# Patient Record
Sex: FEMALE | Race: BLACK OR AFRICAN AMERICAN | Employment: FULL TIME | URBAN - METROPOLITAN AREA
[De-identification: names, ages, dates, MRNs, and addresses within clinical notes are randomized per-mention and may not be internally consistent; named-entity substitution may affect disease eponyms.]

---

## 2020-11-06 LAB
ABO, EXTERNAL RESULT: NORMAL
HEP B, EXTERNAL RESULT: NORMAL
HEPATITIS C ANTIBODY, EXTERNAL RESULT: NORMAL
HIV, EXTERNAL RESULT: NORMAL
RH FACTOR, EXTERNAL RESULT: POSITIVE
RPR, EXTERNAL RESULT: NORMAL
RUBELLA TITER, EXTERNAL RESULT: NORMAL

## 2020-12-26 ENCOUNTER — HOSPITAL ENCOUNTER (OUTPATIENT)
Dept: OTHER | Age: 25
Discharge: HOME | End: 2020-12-26
Payer: COMMERCIAL

## 2020-12-26 LAB
GLUCOSE FASTING: 89 MG/DL
GLUCOSE TOLERANCE TEST 1 HOUR: 160 MG/DL
GLUCOSE TOLERANCE TEST 2 HOUR: 162 MG/DL
GLUCOSE TOLERANCE TEST 3 HOUR: 117 MG/DL

## 2020-12-26 PROCEDURE — 82951 GLUCOSE TOLERANCE TEST (GTT): CPT

## 2020-12-26 PROCEDURE — 36415 COLL VENOUS BLD VENIPUNCTURE: CPT

## 2020-12-26 PROCEDURE — 82952 GTT-ADDED SAMPLES: CPT

## 2021-02-01 LAB — GBS, EXTERNAL RESULT: NEGATIVE

## 2021-02-08 ENCOUNTER — HOSPITAL ENCOUNTER (INPATIENT)
Age: 26
LOS: 2 days | Discharge: HOME | End: 2021-02-10
Attending: OBSTETRICS & GYNECOLOGY | Admitting: OBSTETRICS & GYNECOLOGY
Payer: COMMERCIAL

## 2021-02-08 ENCOUNTER — ANESTHESIA EVENT (OUTPATIENT)
Dept: LABOR AND DELIVERY | Age: 26
End: 2021-02-08
Payer: COMMERCIAL

## 2021-02-08 ENCOUNTER — ANESTHESIA (OUTPATIENT)
Dept: LABOR AND DELIVERY | Age: 26
End: 2021-02-08
Payer: COMMERCIAL

## 2021-02-08 DIAGNOSIS — Z37.9 NORMAL LABOR: Primary | ICD-10-CM

## 2021-02-08 LAB
A/G RATIO: 0.8 (ref 1.1–2.2)
ABO/RH: NORMAL
ALBUMIN SERPL-MCNC: 3.5 G/DL (ref 3.4–5)
ALP BLD-CCNC: 98 U/L (ref 40–129)
ALT SERPL-CCNC: 8 U/L (ref 10–40)
AMPHETAMINE SCREEN, URINE: NORMAL
ANION GAP SERPL CALCULATED.3IONS-SCNC: 11 MMOL/L (ref 3–16)
ANTIBODY SCREEN: NORMAL
AST SERPL-CCNC: 13 U/L (ref 15–37)
BACTERIA: ABNORMAL /HPF
BARBITURATE SCREEN URINE: NORMAL
BENZODIAZEPINE SCREEN, URINE: NORMAL
BILIRUB SERPL-MCNC: <0.2 MG/DL (ref 0–1)
BILIRUBIN URINE: NEGATIVE
BLOOD, URINE: NEGATIVE
BUN BLDV-MCNC: 8 MG/DL (ref 7–20)
BUPRENORPHINE URINE: NORMAL
CALCIUM SERPL-MCNC: 9.5 MG/DL (ref 8.3–10.6)
CANNABINOID SCREEN URINE: NORMAL
CHLORIDE BLD-SCNC: 103 MMOL/L (ref 99–110)
CLARITY: ABNORMAL
CO2: 21 MMOL/L (ref 21–32)
COCAINE METABOLITE SCREEN URINE: NORMAL
COLOR: ABNORMAL
CREAT SERPL-MCNC: 0.5 MG/DL (ref 0.6–1.1)
EPITHELIAL CELLS, UA: ABNORMAL /HPF (ref 0–5)
GFR AFRICAN AMERICAN: >60
GFR NON-AFRICAN AMERICAN: >60
GLOBULIN: 4.3 G/DL
GLUCOSE BLD-MCNC: 122 MG/DL (ref 70–99)
GLUCOSE BLD-MCNC: 86 MG/DL (ref 70–99)
GLUCOSE URINE: NEGATIVE MG/DL
HCT VFR BLD CALC: 40.1 % (ref 36–48)
HEMOGLOBIN: 12.8 G/DL (ref 12–16)
KETONES, URINE: NEGATIVE MG/DL
LEUKOCYTE ESTERASE, URINE: NEGATIVE
Lab: NORMAL
MCH RBC QN AUTO: 27.3 PG (ref 26–34)
MCHC RBC AUTO-ENTMCNC: 31.8 G/DL (ref 31–36)
MCV RBC AUTO: 85.8 FL (ref 80–100)
METHADONE SCREEN, URINE: NORMAL
MICROSCOPIC EXAMINATION: YES
NITRITE, URINE: NEGATIVE
OPIATE SCREEN URINE: NORMAL
OXYCODONE URINE: NORMAL
PDW BLD-RTO: 18.9 % (ref 12.4–15.4)
PERFORMED ON: ABNORMAL
PH UA: 5
PH UA: 5.5 (ref 5–8)
PHENCYCLIDINE SCREEN URINE: NORMAL
PLATELET # BLD: 335 K/UL (ref 135–450)
PMV BLD AUTO: 8.3 FL (ref 5–10.5)
POTASSIUM SERPL-SCNC: 3.8 MMOL/L (ref 3.5–5.1)
PROPOXYPHENE SCREEN: NORMAL
PROTEIN UA: 30 MG/DL
RBC # BLD: 4.68 M/UL (ref 4–5.2)
RBC UA: ABNORMAL /HPF (ref 0–4)
SARS-COV-2, NAAT: NOT DETECTED
SODIUM BLD-SCNC: 135 MMOL/L (ref 136–145)
SPECIFIC GRAVITY UA: >=1.03 (ref 1–1.03)
TOTAL PROTEIN: 7.8 G/DL (ref 6.4–8.2)
URIC ACID, SERUM: 4.4 MG/DL (ref 2.6–6)
URINE TYPE: ABNORMAL
UROBILINOGEN, URINE: 0.2 E.U./DL
WBC # BLD: 9.6 K/UL (ref 4–11)
WBC UA: ABNORMAL /HPF (ref 0–5)

## 2021-02-08 PROCEDURE — 84550 ASSAY OF BLOOD/URIC ACID: CPT

## 2021-02-08 PROCEDURE — 86850 RBC ANTIBODY SCREEN: CPT

## 2021-02-08 PROCEDURE — 85027 COMPLETE CBC AUTOMATED: CPT

## 2021-02-08 PROCEDURE — 81001 URINALYSIS AUTO W/SCOPE: CPT

## 2021-02-08 PROCEDURE — U0002 COVID-19 LAB TEST NON-CDC: HCPCS

## 2021-02-08 PROCEDURE — 10907ZC DRAINAGE OF AMNIOTIC FLUID, THERAPEUTIC FROM PRODUCTS OF CONCEPTION, VIA NATURAL OR ARTIFICIAL OPENING: ICD-10-PCS | Performed by: OBSTETRICS & GYNECOLOGY

## 2021-02-08 PROCEDURE — 6360000002 HC RX W HCPCS: Performed by: OBSTETRICS & GYNECOLOGY

## 2021-02-08 PROCEDURE — 80307 DRUG TEST PRSMV CHEM ANLYZR: CPT

## 2021-02-08 PROCEDURE — 2580000003 HC RX 258: Performed by: OBSTETRICS & GYNECOLOGY

## 2021-02-08 PROCEDURE — 2500000003 HC RX 250 WO HCPCS: Performed by: NURSE ANESTHETIST, CERTIFIED REGISTERED

## 2021-02-08 PROCEDURE — 7200000001 HC VAGINAL DELIVERY

## 2021-02-08 PROCEDURE — 0KQM0ZZ REPAIR PERINEUM MUSCLE, OPEN APPROACH: ICD-10-PCS | Performed by: OBSTETRICS & GYNECOLOGY

## 2021-02-08 PROCEDURE — 80053 COMPREHEN METABOLIC PANEL: CPT

## 2021-02-08 PROCEDURE — 6360000002 HC RX W HCPCS: Performed by: ANESTHESIOLOGY

## 2021-02-08 PROCEDURE — 6370000000 HC RX 637 (ALT 250 FOR IP): Performed by: OBSTETRICS & GYNECOLOGY

## 2021-02-08 PROCEDURE — 3E033VJ INTRODUCTION OF OTHER HORMONE INTO PERIPHERAL VEIN, PERCUTANEOUS APPROACH: ICD-10-PCS | Performed by: OBSTETRICS & GYNECOLOGY

## 2021-02-08 PROCEDURE — 51701 INSERT BLADDER CATHETER: CPT

## 2021-02-08 PROCEDURE — 1220000000 HC SEMI PRIVATE OB R&B

## 2021-02-08 PROCEDURE — 6360000002 HC RX W HCPCS

## 2021-02-08 PROCEDURE — 2500000003 HC RX 250 WO HCPCS

## 2021-02-08 PROCEDURE — 86780 TREPONEMA PALLIDUM: CPT

## 2021-02-08 PROCEDURE — 86901 BLOOD TYPING SEROLOGIC RH(D): CPT

## 2021-02-08 PROCEDURE — 88307 TISSUE EXAM BY PATHOLOGIST: CPT

## 2021-02-08 PROCEDURE — 3700000025 EPIDURAL BLOCK: Performed by: ANESTHESIOLOGY

## 2021-02-08 PROCEDURE — 86900 BLOOD TYPING SEROLOGIC ABO: CPT

## 2021-02-08 RX ORDER — EPHEDRINE SULFATE 50 MG/ML
INJECTION INTRAVENOUS PRN
Status: DISCONTINUED | OUTPATIENT
Start: 2021-02-08 | End: 2021-02-08 | Stop reason: SDUPTHER

## 2021-02-08 RX ORDER — METOCLOPRAMIDE HYDROCHLORIDE 5 MG/ML
INJECTION INTRAMUSCULAR; INTRAVENOUS
Status: COMPLETED
Start: 2021-02-08 | End: 2021-02-08

## 2021-02-08 RX ORDER — LIDOCAINE HYDROCHLORIDE 10 MG/ML
30 INJECTION, SOLUTION EPIDURAL; INFILTRATION; INTRACAUDAL; PERINEURAL PRN
Status: DISCONTINUED | OUTPATIENT
Start: 2021-02-08 | End: 2021-02-08

## 2021-02-08 RX ORDER — DOCUSATE SODIUM 100 MG/1
100 CAPSULE, LIQUID FILLED ORAL 2 TIMES DAILY
Status: DISCONTINUED | OUTPATIENT
Start: 2021-02-08 | End: 2021-02-10 | Stop reason: HOSPADM

## 2021-02-08 RX ORDER — MODIFIED LANOLIN
OINTMENT (GRAM) TOPICAL PRN
Status: DISCONTINUED | OUTPATIENT
Start: 2021-02-08 | End: 2021-02-10 | Stop reason: HOSPADM

## 2021-02-08 RX ORDER — TRISODIUM CITRATE DIHYDRATE AND CITRIC ACID MONOHYDRATE 500; 334 MG/5ML; MG/5ML
SOLUTION ORAL
Status: DISCONTINUED
Start: 2021-02-08 | End: 2021-02-08 | Stop reason: WASHOUT

## 2021-02-08 RX ORDER — ACETAMINOPHEN 325 MG/1
650 TABLET ORAL EVERY 4 HOURS PRN
Status: DISCONTINUED | OUTPATIENT
Start: 2021-02-08 | End: 2021-02-10 | Stop reason: HOSPADM

## 2021-02-08 RX ORDER — FENTANYL/BUPIVACAINE/NS/PF 2-1250MCG
PLASTIC BAG, INJECTION (ML) INJECTION
Status: COMPLETED
Start: 2021-02-08 | End: 2021-02-08

## 2021-02-08 RX ORDER — METOCLOPRAMIDE HYDROCHLORIDE 5 MG/ML
10 INJECTION INTRAMUSCULAR; INTRAVENOUS EVERY 6 HOURS
Status: DISCONTINUED | OUTPATIENT
Start: 2021-02-08 | End: 2021-02-08

## 2021-02-08 RX ORDER — SODIUM CHLORIDE 0.9 % (FLUSH) 0.9 %
10 SYRINGE (ML) INJECTION PRN
Status: DISCONTINUED | OUTPATIENT
Start: 2021-02-08 | End: 2021-02-10 | Stop reason: HOSPADM

## 2021-02-08 RX ORDER — FERROUS SULFATE 325(65) MG
325 TABLET ORAL
COMMUNITY

## 2021-02-08 RX ORDER — SODIUM CHLORIDE 9 MG/ML
INJECTION, SOLUTION INTRAVENOUS CONTINUOUS
Status: DISCONTINUED | OUTPATIENT
Start: 2021-02-08 | End: 2021-02-08

## 2021-02-08 RX ORDER — SODIUM CHLORIDE 0.9 % (FLUSH) 0.9 %
10 SYRINGE (ML) INJECTION PRN
Status: DISCONTINUED | OUTPATIENT
Start: 2021-02-08 | End: 2021-02-08

## 2021-02-08 RX ORDER — HYDROCODONE BITARTRATE AND ACETAMINOPHEN 5; 325 MG/1; MG/1
1 TABLET ORAL EVERY 6 HOURS PRN
Qty: 20 TABLET | Refills: 0 | Status: SHIPPED | OUTPATIENT
Start: 2021-02-08 | End: 2021-02-15

## 2021-02-08 RX ORDER — IBUPROFEN 600 MG/1
600 TABLET ORAL EVERY 6 HOURS PRN
Qty: 30 TABLET | Refills: 1 | Status: SHIPPED | OUTPATIENT
Start: 2021-02-08

## 2021-02-08 RX ORDER — HYDROCODONE BITARTRATE AND ACETAMINOPHEN 5; 325 MG/1; MG/1
2 TABLET ORAL EVERY 4 HOURS PRN
Status: DISCONTINUED | OUTPATIENT
Start: 2021-02-08 | End: 2021-02-10 | Stop reason: HOSPADM

## 2021-02-08 RX ORDER — ONDANSETRON 2 MG/ML
4 INJECTION INTRAMUSCULAR; INTRAVENOUS EVERY 6 HOURS PRN
Status: DISCONTINUED | OUTPATIENT
Start: 2021-02-08 | End: 2021-02-08

## 2021-02-08 RX ORDER — TERBUTALINE SULFATE 1 MG/ML
0.25 INJECTION, SOLUTION SUBCUTANEOUS
Status: DISCONTINUED | OUTPATIENT
Start: 2021-02-08 | End: 2021-02-08

## 2021-02-08 RX ORDER — SODIUM CHLORIDE, SODIUM LACTATE, POTASSIUM CHLORIDE, CALCIUM CHLORIDE 600; 310; 30; 20 MG/100ML; MG/100ML; MG/100ML; MG/100ML
INJECTION, SOLUTION INTRAVENOUS CONTINUOUS
Status: DISCONTINUED | OUTPATIENT
Start: 2021-02-08 | End: 2021-02-08

## 2021-02-08 RX ORDER — LIDOCAINE HYDROCHLORIDE AND EPINEPHRINE 15; 5 MG/ML; UG/ML
INJECTION, SOLUTION EPIDURAL PRN
Status: DISCONTINUED | OUTPATIENT
Start: 2021-02-08 | End: 2021-02-08 | Stop reason: SDUPTHER

## 2021-02-08 RX ORDER — SODIUM CHLORIDE 0.9 % (FLUSH) 0.9 %
10 SYRINGE (ML) INJECTION EVERY 12 HOURS SCHEDULED
Status: DISCONTINUED | OUTPATIENT
Start: 2021-02-08 | End: 2021-02-10 | Stop reason: HOSPADM

## 2021-02-08 RX ORDER — IBUPROFEN 800 MG/1
800 TABLET ORAL EVERY 8 HOURS
Status: DISCONTINUED | OUTPATIENT
Start: 2021-02-08 | End: 2021-02-10 | Stop reason: HOSPADM

## 2021-02-08 RX ORDER — HYDROCODONE BITARTRATE AND ACETAMINOPHEN 5; 325 MG/1; MG/1
1 TABLET ORAL EVERY 4 HOURS PRN
Status: DISCONTINUED | OUTPATIENT
Start: 2021-02-08 | End: 2021-02-10 | Stop reason: HOSPADM

## 2021-02-08 RX ORDER — FENTANYL/BUPIVACAINE/NS/PF 2-1250MCG
12 PLASTIC BAG, INJECTION (ML) INJECTION CONTINUOUS
Status: DISCONTINUED | OUTPATIENT
Start: 2021-02-08 | End: 2021-02-08

## 2021-02-08 RX ADMIN — CEFAZOLIN SODIUM 2000 MG: 10 INJECTION, POWDER, FOR SOLUTION INTRAVENOUS at 18:30

## 2021-02-08 RX ADMIN — IBUPROFEN 800 MG: 800 TABLET, FILM COATED ORAL at 21:56

## 2021-02-08 RX ADMIN — SODIUM CHLORIDE, POTASSIUM CHLORIDE, SODIUM LACTATE AND CALCIUM CHLORIDE: 600; 310; 30; 20 INJECTION, SOLUTION INTRAVENOUS at 13:45

## 2021-02-08 RX ADMIN — EPHEDRINE SULFATE 25 MG: 50 INJECTION, SOLUTION INTRAVENOUS at 18:29

## 2021-02-08 RX ADMIN — Medication 1 MILLI-UNITS/MIN: at 15:15

## 2021-02-08 RX ADMIN — FAMOTIDINE 20 MG: 10 INJECTION, SOLUTION INTRAVENOUS at 18:36

## 2021-02-08 RX ADMIN — Medication 166.7 ML: at 20:58

## 2021-02-08 RX ADMIN — METOCLOPRAMIDE 10 MG: 5 INJECTION, SOLUTION INTRAMUSCULAR; INTRAVENOUS at 18:36

## 2021-02-08 RX ADMIN — SODIUM CHLORIDE: 9 INJECTION, SOLUTION INTRAVENOUS at 17:30

## 2021-02-08 RX ADMIN — Medication 12 ML/HR: at 17:41

## 2021-02-08 RX ADMIN — LIDOCAINE HYDROCHLORIDE AND EPINEPHRINE 5 ML: 15; 5 INJECTION, SOLUTION EPIDURAL at 17:41

## 2021-02-08 RX ADMIN — BENZOCAINE AND LEVOMENTHOL: 200; 5 SPRAY TOPICAL at 21:56

## 2021-02-08 RX ADMIN — SODIUM CHLORIDE: 9 INJECTION, SOLUTION INTRAVENOUS at 18:36

## 2021-02-08 ASSESSMENT — PAIN DESCRIPTION - DESCRIPTORS: DESCRIPTORS: CRAMPING

## 2021-02-08 NOTE — ANESTHESIA PRE PROCEDURE
Department of Anesthesiology  Preprocedure Note       Name:  Meli Notice   Age:  31 y.o.  :  1995                                          MRN:  [id number]         Date:  2021      Surgeon: * No surgeons listed *    Procedure: Labor Pain Control vs   Medications prior to admission:   Prior to Admission medications    Medication Sig Start Date End Date Taking? Authorizing Provider   Prenatal MV-Min-Fe Fum-FA-DHA (PRENATAL 1 PO) Take 1 tablet by mouth daily   Yes Historical Provider, MD   ferrous sulfate (IRON 325) 325 (65 Fe) MG tablet Take 325 mg by mouth daily (with breakfast)   Yes Historical Provider, MD       Current medications:    Current Facility-Administered Medications   Medication Dose Route Frequency Provider Last Rate Last Admin    lactated ringers infusion   Intravenous Continuous Shelly Novak  mL/hr at 21 1345 New Bag at 21 1345    sodium chloride flush 0.9 % injection 10 mL  10 mL Intravenous PRN Shelly Novak MD        oxytocin (PITOCIN) 10 unit bolus from the bag  10 Units Intravenous PRN Shelly Novak MD        And    oxytocin (PITOCIN) 30 units in 500 mL infusion  87.3 noah-units/min Intravenous PRN Shelly Novak MD        ondansetron [facility]) injection 4 mg  4 mg Intravenous Q6H PRN Shelly Novak MD        lidocaine PF 1 % injection 30 mL  30 mL Other PRN Shelly Novak MD        oxytocin (PITOCIN) 30 units in 500 mL infusion  1-20 noah-units/min Intravenous Continuous Shelly Nvoak MD 4 mL/hr at 21 1615 4 noah-units/min at 21 1615    terbutaline (BRETHINE) injection 0.25 mg  0.25 mg Subcutaneous Once PRN Shelly Novak MD        0.9 % sodium chloride infusion   Intravenous Continuous Shelly Novak MD           Allergies:  No Known Allergies    Problem List:  There is no problem list on file for this patient. Past Medical History:  History reviewed. No pertinent past medical history. Past Surgical History:  History reviewed. No pertinent surgical history. Social History:    Social History     Tobacco Use    Smoking status: Never Smoker    Smokeless tobacco: Never Used   Substance Use Topics    Alcohol use: Not Currently     Frequency: Never                                Counseling given: Not Answered      Vital Signs (Current):   Vitals:    02/08/21 1359 02/08/21 1402 02/08/21 1520 02/08/21 1545   BP: (!) 126/93 (!) 129/98 129/84    Pulse: 89 86 81    Resp:  18  18   Temp:    37.1 °C (98.7 °F)   TempSrc:    Oral   Weight:       Height:                                                  BP Readings from Last 3 Encounters:   02/08/21 129/84       NPO Status: Time of last liquid consumption: 0900                        Time of last solid consumption: 0900                        Date of last liquid consumption: 02/08/21                        Date of last solid food consumption: 02/08/21    BMI:   Wt Readings from Last 3 Encounters:   02/08/21 158 lb (71.7 kg)     Body mass index is 30.86 kg/m². CBC:   Lab Results   Component Value Date    WBC 9.6 02/08/2021    RBC 4.68 02/08/2021    HGB 12.8 02/08/2021    HCT 40.1 02/08/2021    MCV 85.8 02/08/2021    RDW 18.9 02/08/2021     02/08/2021       CMP:   Lab Results   Component Value Date     02/08/2021    K 3.8 02/08/2021     02/08/2021    CO2 21 02/08/2021    BUN 8 02/08/2021    CREATININE 0.5 02/08/2021    GFRAA >60 02/08/2021    AGRATIO 0.8 02/08/2021    LABGLOM >60 02/08/2021    GLUCOSE 86 02/08/2021    PROT 7.8 02/08/2021    CALCIUM 9.5 02/08/2021    BILITOT <0.2 02/08/2021    ALKPHOS 98 02/08/2021    AST 13 02/08/2021    ALT 8 02/08/2021       POC Tests: No results for input(s): POCGLU, POCNA, POCK, POCCL, POCBUN, POCHEMO, POCHCT in the last 72 hours.     Coags: No results found for: PROTIME, INR, APTT    HCG (If Applicable): No results found for: PREGTESTUR, PREGSERUM, HCG, HCGQUANT Patient request pain control for labor and delivery. Discussed procedure (epidural,spinal, general and non regional options) and  options. Alternatives, benefits, risks, including but not limited to changes in VSS, allergic reaction, infection, intravascular injection, paresthesia, n/v, severe headache, temporary or permanent rare neurologic sequelae, and failed block. All questions answered and states understanding. Patient agrees to proceed. Choice of anesthetic will be determined by clinical condition at the time of anesthesia initiation. Patient request pain control for labor and delivery. Discussed procedure (epidural,spinal, general and non regional options) and  options. Alternatives, benefits, risks, including but not limited to changes in VSS, allergic reaction, infection, intravascular injection, paresthesia, n/v, severe headache, temporary or permanent rare neurologic sequelae, and failed block. All questions answered and states understanding. Patient agrees to proceed. Choice of anesthetic will be determined by clinical condition at the time of anesthesia initiation.     Antonina Lombard, JUAN - DMITRY   2021

## 2021-02-08 NOTE — H&P
Department of Obstetrics and Gynecology   Obstetrics History and Physical        CHIEF COMPLAINT:  Sent by Dr. Eleuterio Monteiro  for induction due to IUGR, nonreassuring NST in office    HISTORY OF PRESENT ILLNESS:      The patient is a 31 y.o. female at 36w4d. OB History        1    Para        Term                AB        Living           SAB        TAB        Ectopic        Molar        Multiple        Live Births                Patient presents with a chief complaint as above and is being admitted for intrauterine growth restriction    Estimated Due Date: Estimated Date of Delivery: 21    PRENATAL CARE:    Complicated by: IUGR(AC less than 1%, EFW 3.8%), dating by 26wk US    PAST OB HISTORY:  OB History        1    Para        Term                AB        Living           SAB        TAB        Ectopic        Molar        Multiple        Live Births                    Past Medical History:    History reviewed. No pertinent past medical history. Past Surgical History:    History reviewed. No pertinent surgical history. Allergies:  Patient has no known allergies.     Social History:    Social History     Socioeconomic History    Marital status:      Spouse name: Not on file    Number of children: Not on file    Years of education: Not on file    Highest education level: Not on file   Occupational History    Not on file   Social Needs    Financial resource strain: Not on file    Food insecurity     Worry: Not on file     Inability: Not on file   [employer] needs     Medical: Not on file     Non-medical: Not on file   Tobacco Use    Smoking status: Never Smoker    Smokeless tobacco: Never Used   Substance and Sexual Activity    Alcohol use: Not Currently     Frequency: Never    Drug use: Never    Sexual activity: Not Currently   Lifestyle    Physical activity     Days per week: Not on file     Minutes per session: Not on file    Stress: Not on file Relationships    Social connections     Talks on phone: Not on file     Gets together: Not on file     Attends Latter-day service: Not on file     Active member of club or organization: Not on file     Attends meetings of clubs or organizations: Not on file     Relationship status: Not on file    Intimate partner violence     Fear of current or ex partner: Not on file     Emotionally abused: Not on file     Physically abused: Not on file     Forced sexual activity: Not on file   Other Topics Concern    Not on file   Social History Narrative    Not on file     Family History:   History reviewed. No pertinent family history. Medications Prior to Admission:  Medications Prior to Admission: Prenatal MV-Min-Fe Fum-FA-DHA (PRENATAL 1 PO), Take 1 tablet by mouth daily  ferrous sulfate (IRON 325) 325 (65 Fe) MG tablet, Take 325 mg by mouth daily (with breakfast)    REVIEW OF SYSTEMS:        PHYSICAL EXAM:  Vitals:    02/08/21 1325   Weight: 158 lb (71.7 kg)   Height: 5' (1.524 m)     General appearance:  awake, alert, cooperative, no apparent distress, and appears stated age  Neurologic:  Awake, alert, oriented to name, place and time. Lungs:  No increased work of breathing, good air exchange  Abdomen:  Soft, non tender, gravid, consistent with her gestational age   Fetal heart rate:  Reassuring. Pelvis:  Adequate pelvis  Cervix: 3cm  Contraction frequency:      Membranes:  Intact    Labs: CBC: No results found for: WBC, RBC, HGB, HCT, MCV, MCH, MCHC, RDW, PLT, MPV    ASSESSMENT AND PLAN:    Labor: Admit, anticipate normal delivery, routine labor orders  Mild elevated BP, check PIH labs  Fetus: Reassuring  GBS: No  Other: plan pitocin for labor induction for IUGR, nonreassuring NST in office.   Dating by Roland Hopantonio

## 2021-02-08 NOTE — FLOWSHEET NOTE
Dr Saen Whitney notified of pt decels. decels are deep wide low and late. Pt complete and attempted pushing with MD at bedside.

## 2021-02-08 NOTE — FLOWSHEET NOTE
Bedside handoff received from NATI Navarro RN at pt bedside. Pt lying down after epidural placement.

## 2021-02-08 NOTE — ANESTHESIA PROCEDURE NOTES
Epidural Block    Patient location during procedure: OB  Start time: 2021 5:26 PM  End time: 2021 5:41 PM  Reason for block: labor epidural  Staffing  Performed: resident/CRNA   Anesthesiologist: Ric Owens MD  Resident/CRNA: Katina Garcia APRN - CRNA  Preanesthetic Checklist  Completed: patient identified, IV checked, site marked, risks and benefits discussed, surgical consent, monitors and equipment checked, pre-op evaluation, timeout performed, anesthesia consent given, oxygen available and patient being monitored  Epidural  Patient position: sitting  Prep: ChloraPrep and site prepped and draped  Patient monitoring: continuous pulse ox and frequent blood pressure checks  Approach: midline  Location: lumbar (1-5)  Injection technique: LAURENCE saline  Provider prep: mask and sterile gloves  Needle  Needle type: Tuohy   Needle gauge: 17 G  Needle length: 3.5 in  Needle insertion depth: 8 cm  Catheter type: side hole  Catheter at skin depth: 13 cm  Test dose: negative  Assessment  Sensory level: T10  Hemodynamics: stable  Attempts: 1  Additional Notes  Procedure(labor epidural):    Called at 1726 for labor epidural analgesia request. Patient identified, informed consent obtained, and timeout performed. Medical and Surgical history reviewed with pt. Risks/benefits/alternatives of epidural discussed including allergic reaction, infection, bleeding, hypotension, headache, back pain, nerve damage, failed or one-sided block. Also discussed anesthesia options and associated risks in the event of . All questions answered. Verbalizes understanding and requests to proceed. VSS:  Stable throughout      Pt in sitting position. Labor epidural placed using LAURENCE sterile technique (donned mask, hat, and sterile gloves). Back prepped with Chloraprep x 2. Sterile drape applied. Site: L3-4  LAURENCE:  8cm.    Attempts:  1  Re-directs: 1

## 2021-02-09 LAB — TOTAL SYPHILLIS IGG/IGM: NORMAL

## 2021-02-09 PROCEDURE — 6370000000 HC RX 637 (ALT 250 FOR IP): Performed by: OBSTETRICS & GYNECOLOGY

## 2021-02-09 PROCEDURE — 1220000000 HC SEMI PRIVATE OB R&B

## 2021-02-09 RX ADMIN — DOCUSATE SODIUM 100 MG: 100 CAPSULE ORAL at 11:42

## 2021-02-09 RX ADMIN — IBUPROFEN 800 MG: 800 TABLET, FILM COATED ORAL at 06:01

## 2021-02-09 RX ADMIN — ACETAMINOPHEN 650 MG: 325 TABLET ORAL at 05:20

## 2021-02-09 RX ADMIN — IBUPROFEN 800 MG: 800 TABLET, FILM COATED ORAL at 14:19

## 2021-02-09 RX ADMIN — DOCUSATE SODIUM 100 MG: 100 CAPSULE ORAL at 22:44

## 2021-02-09 RX ADMIN — IBUPROFEN 800 MG: 800 TABLET, FILM COATED ORAL at 22:44

## 2021-02-09 ASSESSMENT — PAIN SCALES - GENERAL
PAINLEVEL_OUTOF10: 6
PAINLEVEL_OUTOF10: 5
PAINLEVEL_OUTOF10: 5
PAINLEVEL_OUTOF10: 6

## 2021-02-09 NOTE — PLAN OF CARE
Problem: Anxiety:  Goal: Level of anxiety will decrease  Description: Level of anxiety will decrease  2/8/2021 1807 by Kristin Cazares RN  Outcome: Completed     Problem: Pain - Acute:  Goal: Pain level will decrease  Description: Pain level will decrease  2/8/2021 1807 by Kristin Cazares RN  Outcome: Completed  Goal: Able to cope with pain  Description: Able to cope with pain  2/8/2021 1807 by Kristin Cazares RN  Outcome: Completed     Problem: Urinary Retention:  Goal: Experiences of bladder distention will decrease  Description: Experiences of bladder distention will decrease  2/8/2021 1807 by Kristin Cazares RN  Outcome: Completed  Goal: Urinary elimination within specified parameters  Description: Urinary elimination within specified parameters  2/8/2021 1807 by Kristin Cazares RN  Outcome: Completed     Problem: VAGINAL DELIVERY - RECOVERY AND POST PARTUM  Goal: Vital signs are medically acceptable  2/8/2021 2106 by Angie Ayoub RN  Outcome: Completed  Goal: Patient will remain free of falls  2/8/2021 2106 by Angie Ayoub RN  Outcome: Completed  Goal: Fundus firm at midline  2/8/2021 2106 by Angie Ayoub RN  Outcome: Completed  Goal: Moderate rubra without clots, no purulent discharge, no foul smelling lochia  2/8/2021 2106 by Angie Ayoub RN  Outcome: Completed  Goal: Empties bladder  2/8/2021 2106 by Angie Ayoub RN  Outcome: Completed  Goal: Verbalizes understanding of normal bowel function resumption  2/8/2021 2106 by Angie Ayoub RN  Outcome: Completed  Goal: Edema will be absent or minimal  2/8/2021 2106 by Angie Ayoub RN  Outcome: Completed  Goal: Breasts are soft with nipple integrity intact  2/8/2021 2106 by Angie Ayoub RN  Outcome: Completed  Goal: Demonstrates appropriate breast feeding techniques  2/8/2021 2106 by Angie Ayoub RN  Outcome: Completed  Goal: Appropriate behavior observed  2/8/2021 2106 by Angie Ayoub RN  Outcome: Completed  Goal: Positive Mother-Baby interactions are observed  2/8/2021 2106 by Laura Chow RN  Outcome: Completed  Goal: Perineum intact without discharge or hematoma  2/8/2021 2106 by Laura Chow RN  Outcome: Completed  Goal: Ambulates independently  2/8/2021 2106 by Laura Chow RN  Outcome: Completed

## 2021-02-09 NOTE — LACTATION NOTE
This note was copied from a baby's chart. Lactation Consult Note      LC to room per RN request to assist with feeding. MOB states NB latched well to breast for first feeding, and that staff told her that she must give formula supplements after feeding the breast. OB was attempting to feed the baby a supplement via a bottle, and NB was REFUSING to suck on the bottle. This LC to bedside, as NB was finishing a successful breastfeeding session. NOted per LC; R/L nipples are WNL/everted; tissue is very stretchy. NB was latched easily at L breast in cradle hold. MOB shown how to use c-shape, AND 'teacup' hold with re-latching. Observed SANDY, SRS, and AS. Then right after fed at breast, MOB was shown how to feed supplements(that are ordered per MD). Order states 15-20 ml's of formula every 3 hours. Encouraged MOB to BF on demand, and give supplements as ordered, via oral feeding syringe. Mother denied any pain with latch.

## 2021-02-09 NOTE — FLOWSHEET NOTE
Patient actively pushing. RN remains in continuous attendance at the bedside.  Assessment & evaluation of fetal heart rate ongoing via continuous EFM

## 2021-02-09 NOTE — PROGRESS NOTES
Contraction pain is none. Epidural Yes    VS:   Vitals:    21 19021 19121   BP:  (!) 135/55  122/74   Pulse:  93  92   Resp:  18     Temp:       TempSrc:       SpO2: 98%  98%    Weight:       Height:             FHT: Baseline: 150   Variability: moderate   Accelerations: Present   Decels: Occurs occurs with > 50% contractions but resolved after intrauterine resuscitation. Cervical Exam:   Dilation (cm): 10   Effacement (%): 100   Cervical Characteristics: Mid-Position   Station: +2   Presentation: Vertex  AROM done- meconium stained fluid, FSE placed.   A/P: 31 y.o.  38w2d   · IOL for IUGR: progressing well, will labor down     · Fetus: category II  · GBS: neg  ·

## 2021-02-09 NOTE — ANESTHESIA POSTPROCEDURE EVALUATION
Department of Anesthesiology  Postprocedure Note    Patient: Jenae Cardoza  MRN: [id number]  YOB: 1995  Date of evaluation: 2/9/2021  Time:  9:57 AM     Procedure Summary     Date: 02/08/21 Room / Location:     Anesthesia Start: 1726 Anesthesia Stop: 2054    Procedure: Labor Analgesia Diagnosis:     Scheduled Providers:  Responsible Provider: Jake Cutler MD    Anesthesia Type: epidural ASA Status: 2          Anesthesia Type: epidural    Mani Phase I:      Mani Phase II:      Last vitals: Reviewed and per EMR flowsheets. Anesthesia Post Evaluation    Patient location during evaluation: bedside  Patient participation: complete - patient participated  Level of consciousness: awake and alert  Pain score: 0  Airway patency: patent  Nausea & Vomiting: no vomiting and no nausea  Complications: no  Cardiovascular status: hemodynamically stable  Respiratory status: spontaneous ventilation and room air  Hydration status: stable  Comments: Patient s/p epidural for L&D. Pt denies residual numbness post block. Patient is ambulating and voiding without difficulty. Patient denies back pain, headache, paresthesias, n/v or pruritus. Epidural site is free of signs of infection.

## 2021-02-09 NOTE — PLAN OF CARE
Problem: Discharge Planning:  Goal: Discharged to appropriate level of care  Description: Discharged to appropriate level of care  2/9/2021 1111 by Gifty Marks RN  Outcome: Met This Shift  2/9/2021 0659 by Andrea Wilson RN  Outcome: Ongoing  2/9/2021 0658 by Andrea Wilson RN  Outcome: Ongoing     Problem: Fluid Volume - Imbalance:  Goal: Absence of imbalanced fluid volume signs and symptoms  Description: Absence of imbalanced fluid volume signs and symptoms  2/9/2021 1111 by Gifty Marks RN  Outcome: Met This Shift  2/9/2021 0659 by Andrea Wilson RN  Outcome: Ongoing  2/9/2021 0658 by Andrea Wilson RN  Outcome: Ongoing  Goal: Absence of postpartum hemorrhage signs and symptoms  Description: Absence of postpartum hemorrhage signs and symptoms  2/9/2021 1111 by Gifty Marks RN  Outcome: Met This Shift  2/9/2021 0659 by Andrea Wilson RN  Outcome: Ongoing  2/9/2021 0658 by Andrea Wilson RN  Outcome: Ongoing     Problem: Infection - Risk of, Puerperal Infection:  Goal: Will show no infection signs and symptoms  Description: Will show no infection signs and symptoms  2/9/2021 1111 by Gifty Marks RN  Outcome: Met This Shift  2/9/2021 0659 by Andrea Wilson RN  Outcome: Ongoing  2/9/2021 0658 by Andrea Wilson RN  Outcome: Ongoing     Problem: Mood - Altered:  Goal: Mood stable  Description: Mood stable  2/9/2021 1111 by Gifty Marks RN  Outcome: Met This Shift  2/9/2021 0659 by Andrea Wilson RN  Outcome: Ongoing  2/9/2021 0658 by Andrea Wilson RN  Outcome: Ongoing     Problem: Constipation:  Goal: Bowel elimination is within specified parameters  Description: Bowel elimination is within specified parameters  2/9/2021 1111 by Gifty Marks RN  Outcome: Ongoing  2/9/2021 0659 by Andrea Wilson RN  Outcome: Ongoing  2/9/2021 0658 by Andrea Wilson RN  Outcome: Ongoing     Problem: Pain - Acute:  Goal: Pain level will decrease  Description: Pain level will decrease  2/9/2021 1111 by Juliano Briseno RN  Outcome: Ongoing  2/9/2021 0659 by Zo Restrepo RN  Outcome: Ongoing  2/9/2021 0658 by Zo Restrepo RN  Outcome: Ongoing

## 2021-02-09 NOTE — FLOWSHEET NOTE
Patient up to the bathroom with 2x standby assist.  Pt ambulated and tolerated well. Pt unable to void at this time. Pericare taught, pt instructed on perineal care and self administration of perineal meds. Pt verbalized understanding and may self medicate. Pt instructed to keep medications out of the reach of children. New peripad and icepack on pt. Pt ambulated to wheelchair. Patient transferred to postpartum 4402 via wheelchair and settled into postpartum room. Pt oriented to folder and postpartum care. Oriented to call light, phone and ordering meals. RN's name and phone number posted for pt. Siderails up x2. Pt oriented to equipment. Report to David Taylor RN and care assumed at this time. Pt included in discussion and all questions answered.

## 2021-02-09 NOTE — PROGRESS NOTES
[facility] post-partum note    Post-partum Day #1    Subjective:  Pain is : Mild  Lochia: thin lochia  Nausea: None  Ambulating, tolerate regular diet  Objective:  /65   Pulse 88   Temp 99 °F (37.2 °C) (Oral)   Resp 18   Ht 5' (1.524 m)   Wt 158 lb (71.7 kg)   SpO2 98%   Breastfeeding Unknown   BMI 30.86 kg/m²   Abdominal exam: soft, nontender, nondistended,fundus below umbilicus, firm.     Lab Results   Component Value Date    WBC 9.6 02/08/2021    HGB 12.8 02/08/2021    HCT 40.1 02/08/2021    MCV 85.8 02/08/2021     02/08/2021        Assessment/Plan:      Continue Postpartum care, encourage ambulation  Discharge today: no  Follow up: 6 weeks

## 2021-02-09 NOTE — FLOWSHEET NOTE
Provider and RN at bedside at this time. Per MD- Begin pushing. MD at bedside for the first two pushes. PT pushing effectively and per MD continue pushing.

## 2021-02-09 NOTE — FLOWSHEET NOTE
Home scripts  given to pt. Use and side effects explained to pt. Scripts placed inside  education binder.

## 2021-02-09 NOTE — L&D DELIVERY SUMMARY NOTE
Labor & Delivery Summary  Dilation Complete Date: 21  Dilation Complete Time: 1758    Pre-operative Diagnosis:  Term pregnancy, Induced labor and Pregnancy complicated by: IUGR    Post-operative Diagnosis:  Living  infant, Female, IUGR, meconium    Procedure:  Spontaneous vaginal delivery,  mediolateral episiotomy and repair    Surgeon:   Dr. Rangel Robison for the patient's :  [facility] [[id number]]          Anesthesia:  epidural anesthesia    Estimated blood loss:  300ml    Specimen:  Placenta sent to pathology     Cord blood sent Yes    Complications:  Meconium stained fluid    Condition:  infant stable to general nursery    Details of Procedure: The patient is a 31 y.o. female at 36w4d   OB History        1    Para   1    Term   1            AB        Living   1       SAB        TAB        Ectopic        Molar        Multiple   0    Live Births   1             who was admitted for induction. She received the following interventions: ARBOW and IV Pitocin induction The patient progressed well,did receive an epidural, became complete and started to push. After pushing for 60 min(on and off) , right mediolateral episiotomy was performed and  the fetal head was at the perineum, straight OP, nose and mouth suctioned with bulb suction and the rest of the infant delivered atraumatically, placed on mother abdomen. Cord was clamped and cut and infant handed off to the waiting nurse for evaluation. The placenta was delivered byspontaneous. The perineum and vagina were explored and a second degree laceration was repaired in standard fashion.

## 2021-02-09 NOTE — DISCHARGE SUMMARY
Obstetrical Discharge Form    Gestational Age: 36w4d    Antepartum complications: intrauterine growth restriction    Date of Delivery: 21      Type of Delivery: vaginal, spontaneous    Delivered By: Alex Sewell     Baby:      Information for the patient's :  Annia Laceyfoot [[id number]]   APGAR One: 8     Information for the patient's :  Annia Laceyfoot [[id number]]   APGAR Five: 9     Information for the patient's :  Annia Laceyfoot [[id number]]   Birth Weight: 4 lb 15.9 oz (2.265 kg)       Anesthesia: Local and Epidural    Intrapartum complications: IUGR    Postpartum complications: none    Discharge Medication:    Emily Nirav   Home Medication Instructions [id number]    Printed on:21   Medication Information                      ferrous sulfate (IRON 325) 325 (65 Fe) MG tablet  Take 325 mg by mouth daily (with breakfast)             Prenatal MV-Min-Fe Fum-FA-DHA (PRENATAL 1 PO)  Take 1 tablet by mouth daily                  Discharge Condition:  good    Discharge Date: 2/10    PLAN:  Follow up in 6 weeks for routine PP visit  All questions answered  D/C summary begun at delivery for D/C planning purposes, any delay in discharge from ordered D/C date due to  factors.

## 2021-02-09 NOTE — FLOWSHEET NOTE
This RN to call Dr Rachel Suarez regarding pt IV falling out. PER MD, RN does not need to restart IV but instead watch bleeding and perform more fundal checks.

## 2021-02-10 VITALS
TEMPERATURE: 98.2 F | SYSTOLIC BLOOD PRESSURE: 122 MMHG | HEIGHT: 60 IN | DIASTOLIC BLOOD PRESSURE: 77 MMHG | RESPIRATION RATE: 16 BRPM | BODY MASS INDEX: 31.02 KG/M2 | OXYGEN SATURATION: 98 % | WEIGHT: 158 LBS | HEART RATE: 96 BPM

## 2021-02-10 PROCEDURE — 6370000000 HC RX 637 (ALT 250 FOR IP): Performed by: OBSTETRICS & GYNECOLOGY

## 2021-02-10 RX ADMIN — IBUPROFEN 800 MG: 800 TABLET, FILM COATED ORAL at 14:54

## 2021-02-10 RX ADMIN — DOCUSATE SODIUM 100 MG: 100 CAPSULE ORAL at 08:33

## 2021-02-10 RX ADMIN — IBUPROFEN 800 MG: 800 TABLET, FILM COATED ORAL at 06:33

## 2021-02-10 ASSESSMENT — PAIN SCALES - GENERAL: PAINLEVEL_OUTOF10: 0

## 2021-02-10 NOTE — LACTATION NOTE
This note was copied from a baby's chart. Lactation Progress Note      Data:  LC to bedside per MOB request.      Action: LC undressed infant down to the diaper. Infant placed in football hold on the right breast. Infant SANDY, AS, and SRS. Infant did go to sleep during the feeding and needed to be reawaken several times. Infant latches on to the tip of the nipple when first latching on to the breast. Discussed with MOB how to advance the nipple into infants mouth so that infant is not on the tip of the nipple. Discussed with MOB how to know when the infant is swallowing. [facility] burped infant and then assisted MOB with positioning infant on the left breast. Infant SANDY, AS, and SRS. MOB stated no pain or discomfort felt during the feeding. MOB reports one stool diaper and 2 wet diapers since birth. MOB stated she is offering supplement but infant is not wanting to take it. Response: No other questions at this time.

## 2021-02-10 NOTE — PROGRESS NOTES
[facility] post-partum note    Post-partum Day #2    Subjective:  Pain is : Mild  Lochia: staining only  Nausea: None  Bowel Movement/Flatus:  yes  Breastfeeding: plans to bottle feed    Objective:  Patient Vitals for the past 24 hrs:   BP Temp Temp src Pulse Resp   02/10/21 0110 110/74 98 °F (36.7 °C) Oral 80 18   02/09/21 2005 114/78 98.8 °F (37.1 °C) Oral 84 18   02/09/21 1207 106/65 99 °F (37.2 °C) Oral 88 18      Abdominal exam: soft, nontender, nondistended, no masses or organomegaly.        Lab Results   Component Value Date    WBC 9.6 02/08/2021    HGB 12.8 02/08/2021    HCT 40.1 02/08/2021    MCV 85.8 02/08/2021     02/08/2021          Current Facility-Administered Medications:     sodium chloride flush 0.9 % injection 10 mL, 10 mL, Intravenous, 2 times per day, Fatou Stephens MD    sodium chloride flush 0.9 % injection 10 mL, 10 mL, Intravenous, PRN, Fatou Stephens MD    acetaminophen (TYLENOL) tablet 650 mg, 650 mg, Oral, Q4H PRN, Fatou Stephens MD, 650 mg at 02/09/21 0520    ibuprofen (ADVIL;MOTRIN) tablet 800 mg, 800 mg, Oral, Q8H, Dacia Londono MD, 800 mg at 02/10/21 2163    docusate sodium (COLACE) capsule 100 mg, 100 mg, Oral, BID, Joceline Londono MD, 100 mg at 02/09/21 2244    lansinoh lanolin ointment, , Topical, PRN, Fatou Stephens MD    benzocaine-menthol (DERMOPLAST) 20-0.5 % spray, , Topical, PRN, Fatou Stephens MD, Given at 02/08/21 2156    Tetanus-Diphth-Acell Pertussis (BOOSTRIX) injection 0.5 mL, 0.5 mL, Intramuscular, Prior to discharge, Fatou Stephnes MD    HYDROcodone-acetaminophen (NORCO) 5-325 MG per tablet 1 tablet, 1 tablet, Oral, Q4H PRN **OR** HYDROcodone-acetaminophen (NORCO) 5-325 MG per tablet 2 tablet, 2 tablet, Oral, Q4H PRN, Fatou Stephens MD     Assessment/Plan:      Continue Postpartum care  Discharge today: yes  Follow up: 6 weeks

## 2021-02-10 NOTE — PROGRESS NOTES
Postpartum care teaching completed and forms signed by patient. Copy witnessed by RN and given to patient. Patient verbalized understanding of all teaching points. Prescriptions given. Patient plans to follow-up with [facility] Provider as instructed. Patient verbalizes understanding of discharge instructions and denies further questions. Pt rooming in with infant until infant discharge.